# Patient Record
(demographics unavailable — no encounter records)

---

## 2025-03-14 NOTE — ASSESSMENT
[FreeTextEntry1] : 19 year old lady who present for evaluation of elevated prolactin , irregular periods   # had irregular periods and had work up done by GYn showed elevated prolactin , per patient PCOs was ruled out by Gyn and prolactin kept fluctuating ,she did not tolerate OCP and made her gain a lot of weight - MRI pituitary showed a possible 4 mm hypointensity possible microadenoma - she continues to have irregular periods , no Galactorrhea , + weight gain + hirsutism and acne - she is only on Adderall PRN repeat labs check also for PCOS , if prolactin high and continue to have irregular period might consider trial of low dose cabergoline , will arrange tele post labs    # thyroid nodules thyroid US 7.2024 showed Right 0.5 cm spongiform nodule and left 0.7 cm Tr1 spongiform nodule - no FH of thyroid cancer , no compressive symptoms  no need for follow up  check TSH

## 2025-03-14 NOTE — DATA REVIEWED
[FreeTextEntry1] :   - thyroid US( 7.2024) showed Right 0.5 cm spongiform nodule and left 0.7 cm Tr1 spongiform nodule   MRI  pituitary ( 7/2024)   showed a possible 4 mm hypointensity possible microadenoma

## 2025-03-14 NOTE — PHYSICAL EXAM
[Alert] : alert [Healthy Appearance] : healthy appearance [No Acute Distress] : no acute distress [No Proptosis] : no proptosis [No Lid Lag] : no lid lag [Thyroid Not Enlarged] : the thyroid was not enlarged [No Respiratory Distress] : no respiratory distress [No Accessory Muscle Use] : no accessory muscle use [Clear to Auscultation] : lungs were clear to auscultation bilaterally [Normal S1, S2] : normal S1 and S2 [No Murmurs] : no murmurs [Regular Rhythm] : with a regular rhythm [No Edema] : no peripheral edema [Abdominal Striae] : no abdominal striae [Acanthosis Nigricans] : acanthosis nigricans present [Hirsutism] : hirsutism present [No Tremors] : no tremors [Oriented x3] : oriented to person, place, and time

## 2025-03-14 NOTE — REASON FOR VISIT
[Initial Evaluation] : an initial evaluation [Pituitary Evaluation/ Disorder] : pituitary evaluation/disorder [Thyroid nodule/ MNG] : thyroid nodule/ MNG [Other: _____] : [unfilled]

## 2025-03-14 NOTE — HISTORY OF PRESENT ILLNESS
[FreeTextEntry1] : 19 year old lady who present for evaluation of elevated prolactin , irregular periods    # had irregular periods and had work up done by GYn showed elevated prolactin , per patient PCOs was ruled out by Gyn and prolactin kept fluctuating ,she did not tolerate OCP and made her gain a lot of weight  - MRI pituitary showed a possible 4 mm hypointensity possible microadenoma  - she continues to have irregular periods , no Galactorrhea , + weight gain + hirsutism and acne  - she is only on Adderall PRN    # thyroid nodules thyroid US 7.2024 showed Right 0.5 cm spongiform nodule and left 0.7 cm Tr1 spongiform nodule  - no FH of thyroid cancer , no compressive symptoms

## 2025-05-23 NOTE — ASSESSMENT
[FreeTextEntry1] : 19 year old lady who present for f/u evaluation of elevated prolactin , irregular periods   # had irregular periods and had work up done by GYn showed elevated prolactin , per patient PCOs was ruled out by Gyn and prolactin kept fluctuating ,she did not tolerate OCP and made her gain a lot of weight - MRI pituitary showed a possible 4 mm hypointensity possible microadenoma - she continues to have irregular periods , no Galactorrhea , + weight gain + hirsutism and acne - she is only on Adderall PRN - 5/2025: prolactin mildly elevated and macroprolactin ok , she continues to have spotting , NO galactorrhea  will do a trial of cabergoline 0.5 mg 1/2 tablet once weekly and repeat labs and monitor cycles    # thyroid nodules thyroid US 7.2024 showed Right 0.5 cm spongiform nodule and left 0.7 cm Tr1 spongiform nodule - no FH of thyroid cancer , no compressive symptoms TSH ok , thyroid US no need for FNA  monitor yearly

## 2025-05-23 NOTE — DATA REVIEWED
[FreeTextEntry1] : 5/2025: prolactin 59.2  monomeric 32.2 17 hydroxy 30 total test 37.8 LH 5 DHEAS 331 TSH 1.27  ft4 1.1 FSH 6    - thyroid US( 7.2024) showed Right 0.5 cm spongiform nodule and left 0.7 cm Tr1 spongiform nodule   MRI  pituitary ( 7/2024)   showed a possible 4 mm hypointensity possible microadenoma

## 2025-05-23 NOTE — REASON FOR VISIT
[Home] : at home, [unfilled] , at the time of the visit. [Medical Office: ([facility])___] : at the medical office located in  [Telephone (audio)] : This telephonic visit was provided via audio only technology. [Patient preference] : patient preference. [Verbal consent obtained from patient] : the patient, [unfilled] [Follow - Up] : a follow-up visit [Pituitary Evaluation/ Disorder] : pituitary evaluation/disorder [Thyroid nodule/ MNG] : thyroid nodule/ MNG [Other: _____] : [unfilled]

## 2025-05-23 NOTE — HISTORY OF PRESENT ILLNESS
[FreeTextEntry1] : 19 year old lady who present for f/u evaluation of elevated prolactin, irregular periods    #elevated had irregular periods and had work up done by GYN showed elevated prolactin, per patient PCOs was ruled out by Gyn and prolactin kept fluctuating, she did not tolerate OCP and made her gain a lot of weight  - MRI pituitary showed a possible 4 mm hypo intensity possible microadenoma  - she continues to have irregular periods , no Galactorrhea , + weight gain + hirsutism and acne  - she is only on Adderall PRN  - 5/2025: prolactin mildly elevated and macroprolactin ok , she continues to have spotting , NO galactorrhea    # thyroid nodules thyroid US 7.2024 showed Right 0.5 cm spongiform nodule and left 0.7 cm Tr1 spongiform nodule  - no FH of thyroid cancer , no compressive symptoms  5/2/25: thyroid US with no nodules to FNA